# Patient Record
Sex: MALE | Race: BLACK OR AFRICAN AMERICAN | NOT HISPANIC OR LATINO | Employment: FULL TIME | URBAN - METROPOLITAN AREA
[De-identification: names, ages, dates, MRNs, and addresses within clinical notes are randomized per-mention and may not be internally consistent; named-entity substitution may affect disease eponyms.]

---

## 2020-01-03 ENCOUNTER — OFFICE VISIT (OUTPATIENT)
Dept: UROLOGY | Facility: CLINIC | Age: 60
End: 2020-01-03

## 2020-01-03 VITALS
SYSTOLIC BLOOD PRESSURE: 132 MMHG | WEIGHT: 238 LBS | BODY MASS INDEX: 36.07 KG/M2 | RESPIRATION RATE: 18 BRPM | DIASTOLIC BLOOD PRESSURE: 84 MMHG | HEART RATE: 74 BPM | HEIGHT: 68 IN

## 2020-01-03 DIAGNOSIS — N52.9 ERECTILE DYSFUNCTION, UNSPECIFIED ERECTILE DYSFUNCTION TYPE: ICD-10-CM

## 2020-01-03 DIAGNOSIS — N13.8 BPH WITH URINARY OBSTRUCTION: Primary | ICD-10-CM

## 2020-01-03 DIAGNOSIS — N40.1 BPH WITH URINARY OBSTRUCTION: Primary | ICD-10-CM

## 2020-01-03 PROCEDURE — 99213 OFFICE O/P EST LOW 20 MIN: CPT | Mod: S$GLB,,, | Performed by: SPECIALIST

## 2020-01-03 PROCEDURE — 99213 PR OFFICE/OUTPT VISIT, EST, LEVL III, 20-29 MIN: ICD-10-PCS | Mod: S$GLB,,, | Performed by: SPECIALIST

## 2020-01-03 RX ORDER — TAMSULOSIN HYDROCHLORIDE 0.4 MG/1
0.4 CAPSULE ORAL DAILY
Qty: 30 CAPSULE | Refills: 11 | Status: SHIPPED | OUTPATIENT
Start: 2020-01-03 | End: 2020-10-26

## 2020-01-03 NOTE — PROGRESS NOTES
Subjective:       Patient ID: Piter Robert is a 59 y.o. male.    Chief Complaint: Follow-up (6mths) and Other (been eating poorly/drinking poorly causing a slow stream at times)      HPI:  59-year-old  man who was here following up with me for lower tract symptoms.  He has BPH with very minimal lower urinary tract symptoms.  Recently the symptoms are worsening.  He reports that he has urinary frequency is urgency is.  He clearly understands that it was because he was eating a lot of sweets.  He is trying actively to cut back on those.  But he is open to the idea of trying Flomax which we had introduced to him while back.    He has problems erections.  He uses Cialis when it is available for him and he would like to remain on the Cialis    He did have a digital rectal exam and PSA checked July of 2019 prostate exam was benign.    Past Medical History: History reviewed. No pertinent past medical history.    Past Surgical Historical:   Past Surgical History:   Procedure Laterality Date    TRANSURETHRAL RESECTION OF PROSTATE          Medications:      Past Social History:   Social History     Socioeconomic History    Marital status: Single     Spouse name: Not on file    Number of children: Not on file    Years of education: Not on file    Highest education level: Not on file   Occupational History    Not on file   Social Needs    Financial resource strain: Not on file    Food insecurity:     Worry: Not on file     Inability: Not on file    Transportation needs:     Medical: Not on file     Non-medical: Not on file   Tobacco Use    Smoking status: Former Smoker     Types: Cigarettes    Smokeless tobacco: Never Used   Substance and Sexual Activity    Alcohol use: Never     Frequency: Never    Drug use: Never    Sexual activity: Not on file   Lifestyle    Physical activity:     Days per week: Not on file     Minutes per session: Not on file    Stress: Not on file   Relationships    Social  connections:     Talks on phone: Not on file     Gets together: Not on file     Attends Evangelical service: Not on file     Active member of club or organization: Not on file     Attends meetings of clubs or organizations: Not on file     Relationship status: Not on file   Other Topics Concern    Not on file   Social History Narrative    Not on file       Allergies: Review of patient's allergies indicates:  No Known Allergies     Family History:   Family History   Problem Relation Age of Onset    No Known Problems Father     Cancer Mother         Review of Systems:   systems reviewed and notable for review to worsening lower urinary tract symptoms  All other systems were reviewed Neg except as stated in the HPI    Physical Exam:  General: A&Ox3. No apparent distress. No deformities.  Neck: No masses. Normal thyroid.  Lungs: normal inspiration. No use of accessory muscles.  Heart: normal pulse. No arrhythmias.  Abdomen: Soft. NT. ND. No masses. No hernias. No hepatosplenomegaly.  Lymphatic: Neck and groin nodes negative.  Skin: The skin is warm and dry. No jaundice.  Neurology: Cranial nerves 2-12 crossly intact, no focal weaknesses, no sensation deficits, no motor deficits  Ext: No clubbing, cyanosis or edema.  :  Deferred      Assessment/Plan:       59-year-old man with BPH and lower tract symptoms worsening in nature.  He also has erectile dysfunction.    1.  Start him on Flomax 0.4 mg at bedtime.  We going to call send the prescription to his pharmacy.  2.  Return to see us in 6 weeks for interim checked.  Once the symptoms of urination are controlled we going to revisit but doing the Cialis for erectile problems.  3.  He is up-to-date on PSA and SILVIO checks.    Problem List Items Addressed This Visit        Renal/    BPH with urinary obstruction - Primary    Erectile dysfunction

## 2020-02-24 ENCOUNTER — OFFICE VISIT (OUTPATIENT)
Dept: UROLOGY | Facility: CLINIC | Age: 60
End: 2020-02-24

## 2020-02-24 VITALS
WEIGHT: 244 LBS | HEIGHT: 68 IN | RESPIRATION RATE: 18 BRPM | HEART RATE: 73 BPM | BODY MASS INDEX: 36.98 KG/M2 | DIASTOLIC BLOOD PRESSURE: 72 MMHG | SYSTOLIC BLOOD PRESSURE: 124 MMHG

## 2020-02-24 DIAGNOSIS — N40.1 BPH WITH URINARY OBSTRUCTION: Primary | ICD-10-CM

## 2020-02-24 DIAGNOSIS — N52.9 ERECTILE DYSFUNCTION, UNSPECIFIED ERECTILE DYSFUNCTION TYPE: ICD-10-CM

## 2020-02-24 DIAGNOSIS — N13.8 BPH WITH URINARY OBSTRUCTION: Primary | ICD-10-CM

## 2020-02-24 PROCEDURE — 99213 OFFICE O/P EST LOW 20 MIN: CPT | Mod: S$GLB,,, | Performed by: SPECIALIST

## 2020-02-24 PROCEDURE — 99213 PR OFFICE/OUTPT VISIT, EST, LEVL III, 20-29 MIN: ICD-10-PCS | Mod: S$GLB,,, | Performed by: SPECIALIST

## 2020-02-24 NOTE — PROGRESS NOTES
Subjective:       Patient ID: Piter Robert is a 59 y.o. male.    Chief Complaint: Other (6 week f/u. pt states flomax is helping)      HPI:  59-year-old  man who is here for 6 week follow-up.    He has BPH and lower tract symptoms.  Recently we restarted him on Flomax.  He is reporting any improvement in his ability to void and empty his bladder.  Symptoms a diminishing.  He is very pleased.    He has erectile dysfunction at this point he has taken PD 5 inhibitors in the past and wants to resume dose.  He is interested in Cialis.    He is up-to-date on his digital rectal exam and PSA check.  These were both done in July of 2019.    Past Medical History: History reviewed. No pertinent past medical history.    Past Surgical Historical:   Past Surgical History:   Procedure Laterality Date    TRANSURETHRAL RESECTION OF PROSTATE          Medications:   Medication List with Changes/Refills   Current Medications    TAMSULOSIN (FLOMAX) 0.4 MG CAP    Take 1 capsule (0.4 mg total) by mouth once daily.        Past Social History:   Social History     Socioeconomic History    Marital status: Single     Spouse name: Not on file    Number of children: Not on file    Years of education: Not on file    Highest education level: Not on file   Occupational History    Not on file   Social Needs    Financial resource strain: Not on file    Food insecurity:     Worry: Not on file     Inability: Not on file    Transportation needs:     Medical: Not on file     Non-medical: Not on file   Tobacco Use    Smoking status: Former Smoker     Types: Cigarettes    Smokeless tobacco: Never Used   Substance and Sexual Activity    Alcohol use: Never     Frequency: Never    Drug use: Never    Sexual activity: Not on file   Lifestyle    Physical activity:     Days per week: Not on file     Minutes per session: Not on file    Stress: Not on file   Relationships    Social connections:     Talks on phone: Not on file      Gets together: Not on file     Attends Cheondoism service: Not on file     Active member of club or organization: Not on file     Attends meetings of clubs or organizations: Not on file     Relationship status: Not on file   Other Topics Concern    Not on file   Social History Narrative    Not on file       Allergies: Review of patient's allergies indicates:  No Known Allergies     Family History:   Family History   Problem Relation Age of Onset    No Known Problems Father     Cancer Mother         Review of Systems:   systems reviewed and notable for BPH and lower tract symptoms as well as erectile dysfunction  All other systems were reviewed Neg except as stated in the HPI      Physical Exam:  General: A&Ox3. No apparent distress. No deformities.  Neck: No masses. Normal thyroid.  Lungs: normal inspiration. No use of accessory muscles.  Heart: normal pulse. No arrhythmias.  Abdomen: Soft. NT. ND. No masses. No hernias. No hepatosplenomegaly.  Lymphatic: Neck and groin nodes negative.  Skin: The skin is warm and dry. No jaundice.  Neurology: Cranial nerves 2-12 crossly intact, no focal weaknesses, no sensation deficits, no motor deficits  Ext: No clubbing, cyanosis or edema.  :  Deferred      Assessment/Plan:       59-year-old man with BPH and lower tract symptoms as well as erectile dysfunction.    1.  I gave him prescription today for Tadalafil 5 mg to take as needed for sexual activity.  2.  Continue taking Flomax on a daily basis  3.  Return to clinic in July of 2020 for PSA and digital rectal exam.    Problem List Items Addressed This Visit        Renal/    BPH with urinary obstruction - Primary    Erectile dysfunction

## 2020-08-07 ENCOUNTER — OFFICE VISIT (OUTPATIENT)
Dept: UROLOGY | Facility: CLINIC | Age: 60
End: 2020-08-07

## 2020-08-07 ENCOUNTER — DOCUMENTATION ONLY (OUTPATIENT)
Dept: UROLOGY | Facility: CLINIC | Age: 60
End: 2020-08-07

## 2020-08-07 VITALS
HEART RATE: 89 BPM | RESPIRATION RATE: 18 BRPM | BODY MASS INDEX: 36.98 KG/M2 | SYSTOLIC BLOOD PRESSURE: 132 MMHG | DIASTOLIC BLOOD PRESSURE: 82 MMHG | WEIGHT: 244 LBS | HEIGHT: 68 IN

## 2020-08-07 DIAGNOSIS — N40.1 BPH WITH URINARY OBSTRUCTION: ICD-10-CM

## 2020-08-07 DIAGNOSIS — N52.9 ERECTILE DYSFUNCTION, UNSPECIFIED ERECTILE DYSFUNCTION TYPE: ICD-10-CM

## 2020-08-07 DIAGNOSIS — R31.0 HEMATURIA, GROSS: Primary | ICD-10-CM

## 2020-08-07 DIAGNOSIS — N13.8 BPH WITH URINARY OBSTRUCTION: ICD-10-CM

## 2020-08-07 DIAGNOSIS — Z01.812 PRE-OPERATIVE LABORATORY EXAMINATION: ICD-10-CM

## 2020-08-07 LAB
ANION GAP SERPL CALC-SCNC: 11 MMOL/L (ref 8–17)
BUN/CREAT SERPL: 17.1 (ref 6–20)
CALCIUM SERPL-MCNC: 9.7 MG/DL (ref 8.6–10.2)
CARBON DIOXIDE, CO2: 27 MMOL/L (ref 22–29)
CHLORIDE: 98 MMOL/L (ref 98–107)
CREAT SERPL-MCNC: 1 MG/DL (ref 0.7–1.2)
GFR ESTIMATION: 77.02
GLUCOSE: 109 MG/DL (ref 74–106)
POTASSIUM: 4.2 MMOL/L (ref 3.5–5.1)
PSA, DIAGNOSTIC: 4.79 NG/ML (ref 0–4)
SODIUM: 136 MMOL/L (ref 136–145)
UREA NITROGEN (BUN): 17.1 MG/DL (ref 6–20)

## 2020-08-07 PROCEDURE — 99213 OFFICE O/P EST LOW 20 MIN: CPT | Mod: S$GLB,,, | Performed by: SPECIALIST

## 2020-08-07 PROCEDURE — 99213 PR OFFICE/OUTPT VISIT, EST, LEVL III, 20-29 MIN: ICD-10-PCS | Mod: S$GLB,,, | Performed by: SPECIALIST

## 2020-08-07 NOTE — PROGRESS NOTES
This documentation is to note his PSA variations over the years.    PSA June 2017 was 2.7 ng/mL  PSA July 2018 was 2.75 ng/mL  PSA July 2019 was 3.2 ng/mL  PSA August 2020 was 4.79 ng/mL.  (patient presented with gross hematuria during this visit.)

## 2020-08-07 NOTE — PROGRESS NOTES
Subjective:       Patient ID: Piter Robert is a 59 y.o. male.    Chief Complaint: Hematuria (approx 2 days)      HPI:  59-year-old  man known to me who was presented to me today reporting painless gross hematuria for the last 2 days.  He reported a little bit of discomfort in the left flank but no severe pain.  He does not have any severe risk factors.  He is not a smoker.  He does not work in a high-risk occupation.  He has a history of BPH which were managing with Flomax.  But he reports that he stop Flomax about a month ago.  He has been voiding very well ever since.    He has erectile dysfunction and when given him a script for Tadalafil 5 mg at the last visit.  He is no longer taking it at this point and he is doing well in terms of his erections.    Last digital rectal exam and PSA were from July of 2019 and he is due today.    Past Medical History: History reviewed. No pertinent past medical history.    Past Surgical Historical:   Past Surgical History:   Procedure Laterality Date    TRANSURETHRAL RESECTION OF PROSTATE          Medications:   Medication List with Changes/Refills   Current Medications    TAMSULOSIN (FLOMAX) 0.4 MG CAP    Take 1 capsule (0.4 mg total) by mouth once daily.        Past Social History:   Social History     Socioeconomic History    Marital status: Single     Spouse name: Not on file    Number of children: Not on file    Years of education: Not on file    Highest education level: Not on file   Occupational History    Not on file   Social Needs    Financial resource strain: Not on file    Food insecurity     Worry: Not on file     Inability: Not on file    Transportation needs     Medical: Not on file     Non-medical: Not on file   Tobacco Use    Smoking status: Former Smoker     Types: Cigarettes    Smokeless tobacco: Never Used   Substance and Sexual Activity    Alcohol use: Never     Frequency: Never    Drug use: Never    Sexual activity: Not on file    Lifestyle    Physical activity     Days per week: Not on file     Minutes per session: Not on file    Stress: Not on file   Relationships    Social connections     Talks on phone: Not on file     Gets together: Not on file     Attends Hindu service: Not on file     Active member of club or organization: Not on file     Attends meetings of clubs or organizations: Not on file     Relationship status: Not on file   Other Topics Concern    Not on file   Social History Narrative    Not on file       Allergies: Review of patient's allergies indicates:  No Known Allergies     Family History:   Family History   Problem Relation Age of Onset    No Known Problems Father     Cancer Mother         Review of Systems:   systems reviewed and notable for gross hematuria  All other systems were reviewed Neg except as stated in the HPI    Physical Exam:  General: A&Ox3. No apparent distress. No deformities.  Neck: No masses. Normal thyroid.  Lungs: normal inspiration. No use of accessory muscles.  Heart: normal pulse. No arrhythmias.  Abdomen: Soft. NT. ND. No masses. No hernias. No hepatosplenomegaly.  Lymphatic: Neck and groin nodes negative.  Skin: The skin is warm and dry. No jaundice.  Neurology: Cranial nerves 2-12 crossly intact, no focal weaknesses, no sensation deficits, no motor deficits  Ext: No clubbing, cyanosis or edema.  : External genitalia normal.  Meatus is adequate and inner normal position phallus is normal scrotum is normal.    Anus/perineum normal. Normal Sphincter tone. Prostate is soft, surface smooth, size estimated at about 40 grams    Urinalysis:  Microscopic evaluation and urine today shows 0-3 white cells per high-power field, too numerous to count red cells.  No evidence of any bacteria    Assessment/Plan:       59-year-old man with gross hematuria was presenting for follow-up.  He also has BPH.    1.  Obtain blood for BMP today  2.  Obtain blood for serum PSA today  3.  Digital rectal  exam is been completed today  4.  Set him up for office cystoscopy  5.  After the BMP is reviewed we will order a CT urogram for hematuria evaluation.  6.  Continue monitoring BPH and lower tract symptoms.  Patient has taking himself off of Flomax and he is doing well.  7.  Continue monitoring erections.  He stopped taking they generic Cialis and is doing well.    Problem List Items Addressed This Visit        Renal/    BPH with urinary obstruction    Relevant Orders    Prostate Specific Antigen, Diagnostic    Erectile dysfunction      Other Visit Diagnoses     Pre-operative laboratory examination    -  Primary    Relevant Orders    Basic metabolic panel (Completed)    Hematuria, gross

## 2020-08-10 ENCOUNTER — TELEPHONE (OUTPATIENT)
Dept: UROLOGY | Facility: CLINIC | Age: 60
End: 2020-08-10

## 2020-08-10 NOTE — TELEPHONE ENCOUNTER
Spoke with pt regarding lab results given, advised that [facility] will proceed with CT urogram and cystoscopy. Pt verbalized understanding. NB

## 2020-09-21 ENCOUNTER — OFFICE VISIT (OUTPATIENT)
Dept: UROLOGY | Facility: CLINIC | Age: 60
End: 2020-09-21

## 2020-09-21 VITALS
WEIGHT: 238 LBS | HEART RATE: 67 BPM | HEIGHT: 68 IN | RESPIRATION RATE: 18 BRPM | BODY MASS INDEX: 36.07 KG/M2 | SYSTOLIC BLOOD PRESSURE: 129 MMHG | DIASTOLIC BLOOD PRESSURE: 83 MMHG

## 2020-09-21 DIAGNOSIS — N40.1 BPH WITH URINARY OBSTRUCTION: ICD-10-CM

## 2020-09-21 DIAGNOSIS — R31.0 HEMATURIA, GROSS: Primary | ICD-10-CM

## 2020-09-21 DIAGNOSIS — N13.8 BPH WITH URINARY OBSTRUCTION: ICD-10-CM

## 2020-09-21 PROCEDURE — 81001 URINALYSIS AUTO W/SCOPE: CPT | Mod: S$GLB,,, | Performed by: SPECIALIST

## 2020-09-21 PROCEDURE — 99213 OFFICE O/P EST LOW 20 MIN: CPT | Mod: 25,S$GLB,, | Performed by: SPECIALIST

## 2020-09-21 PROCEDURE — 81001 PR  URINALYSIS, AUTO, W/SCOPE: ICD-10-PCS | Mod: S$GLB,,, | Performed by: SPECIALIST

## 2020-09-21 PROCEDURE — 99213 PR OFFICE/OUTPT VISIT, EST, LEVL III, 20-29 MIN: ICD-10-PCS | Mod: 25,S$GLB,, | Performed by: SPECIALIST

## 2020-09-21 RX ORDER — LACTULOSE 10 G/15ML
SOLUTION ORAL; RECTAL 3 TIMES DAILY
COMMUNITY

## 2020-09-21 NOTE — PROGRESS NOTES
Subjective:       Patient ID: Piter Robert is a 59 y.o. male.    Chief Complaint: Hematuria (This morning and a few days ago)      HPI: 59-year-old  man known to me last seen in clinic on 8/7/2020 for painless gross hematuria.  He was set up for CT urogram which he has not done yet.  He is to pending cystoscopy.  He did well since that last visit in August until recently when he started experiencing gross hematuria a few days ago and then again this morning.  He denies any burning with urination denies any signs of urinary tract infection. He does not have any severe risk factors for urothelial malignancy.  He is not a smoker.  He does not work in a high-risk occupation.  He has a history of BPH which were managing with Flomax which he stopped about 2 months ago.  He has been voiding very well ever since.     He has erectile dysfunction for which he uses Tadalafil 5 mg which works well for him when needed.  He does not need all the time.       He had a PSA and a digital rectal exam on the visit on 08/07/2020.  SILVIO was benign.    PSA history shown below    PSA history:  PSA June 2017 was 2.7 ng/mL  PSA July 2018 was 2.75 ng/mL  PSA July 2019 was 3.2 ng/mL  PSA August 2020 was 4.79 ng/mL.  (patient presented with gross hematuria during this visit.)    Past Medical History: History reviewed. No pertinent past medical history.    Past Surgical Historical:   Past Surgical History:   Procedure Laterality Date    TRANSURETHRAL RESECTION OF PROSTATE          Medications:   Medication List with Changes/Refills   Current Medications    LACTULOSE (CHRONULAC) 10 GRAM/15 ML SOLUTION    Take by mouth 3 (three) times daily.    TAMSULOSIN (FLOMAX) 0.4 MG CAP    Take 1 capsule (0.4 mg total) by mouth once daily.        Past Social History:   Social History     Socioeconomic History    Marital status: Single     Spouse name: Not on file    Number of children: Not on file    Years of education: Not on file     Highest education level: Not on file   Occupational History    Not on file   Social Needs    Financial resource strain: Not on file    Food insecurity     Worry: Not on file     Inability: Not on file    Transportation needs     Medical: Not on file     Non-medical: Not on file   Tobacco Use    Smoking status: Former Smoker     Types: Cigarettes    Smokeless tobacco: Never Used   Substance and Sexual Activity    Alcohol use: Never     Frequency: Never    Drug use: Never    Sexual activity: Not on file   Lifestyle    Physical activity     Days per week: Not on file     Minutes per session: Not on file    Stress: Not on file   Relationships    Social connections     Talks on phone: Not on file     Gets together: Not on file     Attends Zoroastrianism service: Not on file     Active member of club or organization: Not on file     Attends meetings of clubs or organizations: Not on file     Relationship status: Not on file   Other Topics Concern    Not on file   Social History Narrative    Not on file       Allergies: Review of patient's allergies indicates:  No Known Allergies     Family History:   Family History   Problem Relation Age of Onset    No Known Problems Father     Cancer Mother         Review of Systems:   systems reviewed and notable for gross painless hematuria  All other systems were reviewed Neg except as stated in the HPI    Physical Exam:  General: A&Ox3. No apparent distress. No deformities.  Neck: No masses. Normal thyroid.  Lungs: normal inspiration. No use of accessory muscles.  Heart: normal pulse. No arrhythmias.  Abdomen: Soft. NT. ND. No masses. No hernias. No hepatosplenomegaly.  Lymphatic: Neck and groin nodes negative.  Skin: The skin is warm and dry. No jaundice.  Neurology: Cranial nerves 2-12 crossly intact, no focal weaknesses, no sensation deficits, no motor deficits  Ext: No clubbing, cyanosis or edema.  :  Deferred    Urinalysis:  Dipstick part shows some protein urea  negative for glucosuria nitrite negative leukocytes negative.  Microscopy showed 0-5 wbc's greater than 100 RBCs red epithelial cells trace bacteria    Assessment/Plan:       59-year-old male presenting with painless gross hematuria.    1.  [place] a CT urogram is already in the system.  We were make sure that he set up for imaging  2.  Set him up for cystoscopy in office  3.  Urinalysis was reviewed toda no evidence of an active infection.  Severe microscopic hematuria reported    Problem List Items Addressed This Visit        Renal/    BPH with urinary obstruction      Other Visit Diagnoses     Hematuria, gross    -  Primary    Relevant Orders    Cystoscopy

## 2020-09-28 ENCOUNTER — TELEPHONE (OUTPATIENT)
Dept: UROLOGY | Facility: CLINIC | Age: 60
End: 2020-09-28

## 2020-09-28 NOTE — TELEPHONE ENCOUNTER
Spoke with pt, advised him of Lauren reason for telephone call. Pt verbalized understanding. VINITA

## 2020-10-01 ENCOUNTER — TELEPHONE (OUTPATIENT)
Dept: UROLOGY | Facility: CLINIC | Age: 60
End: 2020-10-01

## 2020-10-01 NOTE — TELEPHONE ENCOUNTER
----- Message from Ally Cisneros sent at 10/1/2020  2:14 PM CDT -----  Patient called in regards to r/s his procedure , patient has been calling since this morning , please call back at  [phone].          Thanks,  Ally Cisneros

## 2020-10-05 ENCOUNTER — PROCEDURE VISIT (OUTPATIENT)
Dept: UROLOGY | Facility: CLINIC | Age: 60
End: 2020-10-05

## 2020-10-05 VITALS
WEIGHT: 232 LBS | SYSTOLIC BLOOD PRESSURE: 146 MMHG | RESPIRATION RATE: 18 BRPM | DIASTOLIC BLOOD PRESSURE: 79 MMHG | HEART RATE: 77 BPM | OXYGEN SATURATION: 99 % | HEIGHT: 68 IN | BODY MASS INDEX: 35.16 KG/M2

## 2020-10-05 DIAGNOSIS — R31.0 HEMATURIA, GROSS: ICD-10-CM

## 2020-10-05 PROCEDURE — 52281 CYSTOSCOPY AND TREATMENT: CPT | Mod: S$GLB,,, | Performed by: SPECIALIST

## 2020-10-05 PROCEDURE — 51741 ELECTRO-UROFLOWMETRY FIRST: CPT | Mod: 26,S$GLB,, | Performed by: SPECIALIST

## 2020-10-05 PROCEDURE — 51741 PR UROFLOWMETRY, COMPLEX: ICD-10-PCS | Mod: 26,S$GLB,, | Performed by: SPECIALIST

## 2020-10-05 PROCEDURE — 52281 PR CYSTOSCOPY,DIL URETHRAL STRICTURE: ICD-10-PCS | Mod: S$GLB,,, | Performed by: SPECIALIST

## 2020-10-05 RX ORDER — CIPROFLOXACIN 500 MG/1
500 TABLET ORAL
Status: COMPLETED | OUTPATIENT
Start: 2020-10-05 | End: 2020-10-05

## 2020-10-05 RX ADMIN — CIPROFLOXACIN 500 MG: 500 TABLET ORAL at 08:10

## 2020-10-05 NOTE — PATIENT INSTRUCTIONS
Cystoscopy    Cystoscopy is a procedure that lets your doctor look directly inside your urethra and bladder. It can be used to:  · Help diagnose a problem with your urethra, bladder, or kidneys.  · Take a sample (biopsy) of bladder or urethral tissue.  · Treat certain problems (such as removing kidney stones).  · Place a stent to bypass an obstruction.  · Take special X-rays of the kidneys.  Based on the findings, your doctor may recommend other tests or treatments.  What is a cystoscope?  A cystoscope is a telescope-like instrument that contains lenses and fiberoptics (small glass wires that make bright light). The cystoscope may be straight and rigid, or flexible to bend around curves in the urethra. The doctor may look directly into the cystoscope, or project the image onto a monitor.  Getting ready  · Ask your doctor if you should stop taking any medicines before the procedure.  · Ask whether you should avoid eating or drinking anything after midnight before the procedure.  · Follow any other instructions your doctor gives you.  Tell your doctor before the exam if you:  · Take any medicines, such as aspirin or blood thinners  · Have allergies to any medicines  · Are pregnant   The procedure  Cystoscopy is done in the doctors office, surgery center, or hospital. The doctor and a nurse are present during the procedure. It takes only a few minutes, longer if a biopsy, X-ray, or treatment needs to be done.  During the procedure:  · You lie on an exam table on your back, knees bent and legs apart. You are covered with a drape.  · Your urethra and the area around it are washed. Anesthetic jelly may be applied to numb the urethra. Other pain medicine is usually not needed. In some cases, you may be offered a mild sedative to help you relax. If a more extensive procedure is to be done, such as a biopsy or kidney stone removal, general anesthesia may be needed.  · The cystoscope is inserted. A sterile fluid is put  into the bladder to expand it. You may feel pressure from this fluid.  · When the procedure is done, the cystoscope is removed.  After the procedure  If you had a sedative, general anesthesia, or spinal anesthesia, you must have someone drive you home. Once youre home:  · Drink plenty of fluids.  · You may have burning or light bleeding when you urinate--this is normal.  · Medicines may be prescribed to ease any discomfort or prevent infection. Take these as directed.  · Call your doctor if you have heavy bleeding or blood clots, burning that lasts more than a day, a fever over 100°F  (38° C), or trouble urinating.  Date Last Reviewed: 1/1/2017  © 8593-6989 [employer]. [address] [place] [address], [place] [address]. All rights reserved. This information is not intended as a substitute for professional medical care. Always follow your healthcare professional's instructions.

## 2020-10-05 NOTE — PROCEDURES
"Cystoscopy    Date/Time: 10/5/2020 8:20 AM  Performed by: Facundo Cuellar MD  Authorized by: Facundo Cuellar MD     Consent Done?:  Yes (Written)  Time out: Immediately prior to procedure a "time out" was called to verify the correct patient, procedure, equipment, support staff and site/side marked as required.    Indications: hematuria    Position:  Supine  Anesthesia:  Intraurethral instillation  Patient sedated?: No    Preparation: Patient was prepped and draped in usual sterile fashion      Scope type:  Flexible cystoscope  External exam normal: Yes    Digital exam performed: No    Urethra normal: No         Urethral Internal Findings:  Stricture  Prostate normal: No (History of previous TURP.  There was some dystrophic calcification on the prostate.  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  We passed a wire through the flexible scope and did a fascial dilation of the stricture stricture in order to be able to extend and passed the scope into the bladder.      Complex Uroflow    Date/Time: 10/5/2020 8:20 AM  Performed by: Facundo Cuellar MD  Authorized by: Facundo Cuellar MD   Preparation: Patient was prepped and draped in the usual sterile fashion.  Local anesthesia used: no    Anesthesia:  Local anesthesia used: no    Sedation:  Patient sedated: no    Patient tolerance: patient tolerated the procedure well with no immediate complications  Comments: Voided volume:  218 mL  Peak flow:  16 mL/sec  Residual by bladder scan ultrasound:  7 mL  Flow curve:  Bell curve    Assessment:  History of gross hematuria, history of previous TURP with dystrophic prostatic calcifications.  CT scan showed thickened bladder wall " nonspecific as well as a small nonobstructing stone in one of the kidneys.  Recent PSA was 4.79.  Patient has a history of prostate biopsy in 2014.    Plan:  1.  I will set him up for repeat prostate biopsy.  His PSA is high enough to warrant a biopsy.  2.  If the biopsy is negative then I will bring him back for a redo TURP to address or the dystrophic calcifications was I think is the major source of his bleeding  3.  If the biopsy is positive then we could talk about prostatectomy.  4.  Which see patient back in clinic for transrectal ultrasound-guided biopsy of the prostate

## 2020-10-14 ENCOUNTER — TELEPHONE (OUTPATIENT)
Dept: UROLOGY | Facility: CLINIC | Age: 60
End: 2020-10-14

## 2020-10-14 NOTE — TELEPHONE ENCOUNTER
Called to confirm appt for bx in the morning (10/15/20); pt did not answer, left message on voicemail to be here for 0750 and to remind pt to have a  and to do his fleets enema 2 hours prior to appt.

## 2020-10-15 ENCOUNTER — PROCEDURE VISIT (OUTPATIENT)
Dept: UROLOGY | Facility: CLINIC | Age: 60
End: 2020-10-15

## 2020-10-15 VITALS
DIASTOLIC BLOOD PRESSURE: 73 MMHG | BODY MASS INDEX: 33.19 KG/M2 | OXYGEN SATURATION: 95 % | HEIGHT: 68 IN | HEART RATE: 72 BPM | RESPIRATION RATE: 18 BRPM | SYSTOLIC BLOOD PRESSURE: 123 MMHG | WEIGHT: 219 LBS

## 2020-10-15 DIAGNOSIS — R97.20 ELEVATED PSA: Primary | ICD-10-CM

## 2020-10-15 PROCEDURE — 96372 PR INJECTION,THERAP/PROPH/DIAG2ST, IM OR SUBCUT: ICD-10-PCS | Mod: 59,S$GLB,, | Performed by: SPECIALIST

## 2020-10-15 PROCEDURE — 55700 TRUS: CPT | Mod: S$GLB,,, | Performed by: SPECIALIST

## 2020-10-15 PROCEDURE — 76872 TRUS: ICD-10-PCS | Mod: S$GLB,,, | Performed by: SPECIALIST

## 2020-10-15 PROCEDURE — 55700 TRUS: ICD-10-PCS | Mod: S$GLB,,, | Performed by: SPECIALIST

## 2020-10-15 PROCEDURE — 96372 THER/PROPH/DIAG INJ SC/IM: CPT | Mod: 59,S$GLB,, | Performed by: SPECIALIST

## 2020-10-15 PROCEDURE — 76872 US TRANSRECTAL: CPT | Mod: S$GLB,,, | Performed by: SPECIALIST

## 2020-10-15 RX ORDER — PROMETHAZINE HYDROCHLORIDE 25 MG/ML
25 INJECTION, SOLUTION INTRAMUSCULAR; INTRAVENOUS
Status: COMPLETED | OUTPATIENT
Start: 2020-10-15 | End: 2020-10-15

## 2020-10-15 RX ORDER — DIAZEPAM 10 MG/1
10 TABLET ORAL
Status: COMPLETED | OUTPATIENT
Start: 2020-10-15 | End: 2020-10-15

## 2020-10-15 RX ORDER — GENTAMICIN SULFATE 40 MG/ML
80 INJECTION, SOLUTION INTRAMUSCULAR; INTRAVENOUS ONCE
Status: COMPLETED | OUTPATIENT
Start: 2020-10-15 | End: 2020-10-15

## 2020-10-15 RX ORDER — CEFTRIAXONE 1 G/1
1 INJECTION, POWDER, FOR SOLUTION INTRAMUSCULAR; INTRAVENOUS
Status: COMPLETED | OUTPATIENT
Start: 2020-10-15 | End: 2020-10-15

## 2020-10-15 RX ORDER — MEPERIDINE HYDROCHLORIDE 25 MG/ML
25 INJECTION INTRAMUSCULAR; INTRAVENOUS; SUBCUTANEOUS ONCE
Status: COMPLETED | OUTPATIENT
Start: 2020-10-15 | End: 2020-10-15

## 2020-10-15 RX ADMIN — CEFTRIAXONE 1 G: 1 INJECTION, POWDER, FOR SOLUTION INTRAMUSCULAR; INTRAVENOUS at 09:10

## 2020-10-15 RX ADMIN — PROMETHAZINE HYDROCHLORIDE 25 MG: 25 INJECTION, SOLUTION INTRAMUSCULAR; INTRAVENOUS at 09:10

## 2020-10-15 RX ADMIN — MEPERIDINE HYDROCHLORIDE 25 MG: 25 INJECTION INTRAMUSCULAR; INTRAVENOUS; SUBCUTANEOUS at 09:10

## 2020-10-15 RX ADMIN — GENTAMICIN SULFATE 80 MG: 40 INJECTION, SOLUTION INTRAMUSCULAR; INTRAVENOUS at 09:10

## 2020-10-15 RX ADMIN — DIAZEPAM 10 MG: 10 TABLET ORAL at 09:10

## 2020-10-15 NOTE — PROCEDURES
"TRUS    Date/Time: 10/15/2020 8:20 AM  Performed by: Facundo Cuellar MD  Authorized by: Facundo Cuellar MD     Consent Done?:  Yes (Written)  Time out: Immediately prior to procedure a "time out" was called to verify the correct patient, procedure, equipment, support staff and site/side marked as required.    Indications: Elevated PSA    Position:  Left lateral  Anesthesia:  10cc's 1% Lidocaine and Lidocaine jelly  Patient sedated: Yes    Sedation:  Other  Prostate Size:  49.96 g  Lesions:: Yes         Type:  Mixed hypo- and hyperechoic  Left Base Biopsies: 2  Left Mid Biopsies: 2  Left [place] Biopsies: 2  Right Base Biopsies: 2  Right Mid Biopsies: 2  Right [place] Biopsies: 2  Transitional zone: No    Total Biopsies:  12    Patient tolerance:  Patient tolerated the procedure well with no immediate complications     Patient was given post biopsy instructions.  He was instructed of the possibility of blood in the urine blood in the stool blood in the semen for up to 6-8 weeks.  He has been instructed to watch out for any flu-like symptoms malaise fatigue etc.  Patient has been notified to give us a call if he has any temperatures.  We should see him in clinic in 2 weeks for post biopsy results.      Briefly 59-year-old man who presented for gross hematuria evaluation.  CT urogram showed an enlarged prostate.  No upper tract abnormalities.  Cystoscopy showed history of previous TURP with dystrophic calcifications in the prostatic fossa.  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  This could potentially be the source of the bleeding.  Patient's PSA was elevated so we recommended a biopsy for which he is here today.  The plan will be to see what the biopsy results show and make a decision if we will bring him back for a repeat TURP or a prostatectomy.        "

## 2020-10-15 NOTE — PATIENT INSTRUCTIONS

## 2020-10-17 ENCOUNTER — DOCUMENTATION ONLY (OUTPATIENT)
Dept: UROLOGY | Facility: CLINIC | Age: 60
End: 2020-10-17

## 2020-10-17 NOTE — PROGRESS NOTES
Outside records from a [facility] Department urology reviewed.  .  Additional records showed that a transrectal ultrasound of the prostate was performed on September 15, 2011.  Transrectal ultrasound volume of the prostate at that time was estimated at about 60 g.  He had another transrectal ultrasound-guided biopsy of the prostate in March 12, 2013.  Trust volume at that time was estimated at 83 g. TURP was perform on January 2, 2014, there is no indication of how much tissue was removed.

## 2020-10-26 ENCOUNTER — OFFICE VISIT (OUTPATIENT)
Dept: UROLOGY | Facility: CLINIC | Age: 60
End: 2020-10-26

## 2020-10-26 VITALS
HEIGHT: 68 IN | WEIGHT: 219 LBS | HEART RATE: 78 BPM | SYSTOLIC BLOOD PRESSURE: 143 MMHG | BODY MASS INDEX: 33.19 KG/M2 | DIASTOLIC BLOOD PRESSURE: 83 MMHG

## 2020-10-26 DIAGNOSIS — R31.0 HEMATURIA, GROSS: ICD-10-CM

## 2020-10-26 DIAGNOSIS — N40.1 BPH WITH OBSTRUCTION/LOWER URINARY TRACT SYMPTOMS: Primary | ICD-10-CM

## 2020-10-26 DIAGNOSIS — N13.8 BPH WITH OBSTRUCTION/LOWER URINARY TRACT SYMPTOMS: Primary | ICD-10-CM

## 2020-10-26 DIAGNOSIS — Z90.79 S/P TURP: ICD-10-CM

## 2020-10-26 PROCEDURE — 99213 OFFICE O/P EST LOW 20 MIN: CPT | Mod: S$GLB,,, | Performed by: SPECIALIST

## 2020-10-26 PROCEDURE — 99213 PR OFFICE/OUTPT VISIT, EST, LEVL III, 20-29 MIN: ICD-10-PCS | Mod: S$GLB,,, | Performed by: SPECIALIST

## 2020-10-26 NOTE — PROGRESS NOTES
Patient seen and examined.  Clinical data reviewed.  Case discussed with the nurse practitioner.  I agree with her assessment and plan.    Briefly 59-year-old man who presented with gross hematuria.  He has an enlarged prostate as well.  He has a history of a needle biopsy of the prostate multiple years ago as well as a history of TURP.  CT urogram was negative, PSA was high so we recommended a biopsy.  Cystoscopy showed regrowth of prostatic tissue with prostatic varices suggestive of source of bleeding.    He underwent a prostate biopsy about 2 weeks ago and the results showed that the biopsy was completely negative.  The plan today will be to schedule him for re-TURP.

## 2020-10-26 NOTE — PROGRESS NOTES
Chief Complaint:   Chief Complaint   Patient presents with    Other     Trus BX f/u       HPI:  59-year-old  male known to the service of Dr. Cuellar who presents for TRUS biopsy results.      He has a longstanding history of BPH with lower urinary tract symptoms.  He underwent a transrectal ultrasound of the prostate by an outside facility on September 15, 2011 with estimated prostate volume at the time around 60 g.  Another transrectal ultrasound guided biopsy of the prostate was performed on March 12, 2013 with estimated prostate volume at the time around 83 g.  TURP performed by another provider on January 2, 2014 without any documentation on how much tissue was removed.    He started experiencing gross hematuria, underwent evaluation with a CT urogram which showed an enlarged prostate and no upper tract abnormalities.  Cystoscopy showed history of previous TURP with dystrophic calcification in the prostatic fossa.  Prostatic varices were also noted.  These could possibly be the source of the bleeding.  His PSA was elevated at 4.79 NG/mL in August 2020, so we decided to perform a truss biopsy to determine if he will be taken back for repeat TURP or a prostatectomy.  He presents today for pathology results.    TRUS biopsy performed on 10/15/2020 shows benign prostatic tissue in 12/12 cores.  Estimated prostate volume at the time 49.96 g.  Patient agrees to proceeding with repeat TURP, planning for mid November 2020.    He also has a history of erectile dysfunction which he uses tadalafil 5 mg which works well for him when needed.  He does not need it all the time.    PSA history:  PSA June 2017 was 2.7 ng/mL  PSA July 2018 was 2.75 ng/mL  PSA July 2019 was 3.2 ng/mL  PSA August 2020 was 4.79 ng/mL.  (patient presented with gross hematuria during this visit.)    Allergies:  Patient has no known allergies.    Medications:  has a current medication list which includes the following prescription(s):  lactulose.    Review of Systems:  General: No fever, chills, vision changes, dizziness, weakness, fatigue, unexplained weight loss, confusion, or mood swings.  Skin: No rashes, itching, or changes in color/texture of skin.  Chest: Denies RUST, cyanosis, wheezing, cough, and sputum production.  Abdomen: Appetite fine. Denies diarrhea, abdominal pain, hematemesis, or blood in stool.  Musculoskeletal: No joint stiffness or swelling. No painful lymph nodes.  : reviewed and negative except as stated above in the HPI.  All other review of systems negative.    PMH:   has a past medical history of BPH with obstruction/lower urinary tract symptoms.    PSH:   has a past surgical history that includes Transurethral resection of prostate.    FamHx: family history includes Cancer in his mother; No Known Problems in his father.    SocHx:  reports that he has quit smoking. His smoking use included cigarettes. He has never used smokeless tobacco. He reports that he does not drink alcohol or use drugs.      Physical Exam:  Vitals:    10/26/20 1111   BP: (!) 143/83   Pulse: 78     General: AAOx3, no apparent distress, no deformities  Neck: supple, no masses, normal thyroid, full ROM  Lungs: CTAB, no adventitious breath sounds, normal inspiration, no use of accessory muscles  Heart: regular rate and rhythm, no arrhythmias  Abdomen: soft, NT, ND, no masses, no hernias, no hepatosplenomegaly  Lymphatic: no unusually enlarged or tender lymph nodes  Skin: warm and dry, no jaundice, no rash  Ext: without edema or deformity, MONTOYA, ambulates independently  : deferred    Labs/Studies: path results as above    Impression/Plan:   BPH with obstruction/lower urinary tract symptoms  Comments:  estimated prostate size 49.96g on recent TRUS biopsy which showed benign prostatic tissue in 12/12 cores, plan for repeat TURP    S/P TURP  Comments:  hx of TURP in 1/2014 by another provider    Hematuria, gross  Comments:  resolved, prostate tissue likely  the source of bleeding so will plan for repeat TURP as TRUS bx negative for cancer        Follow up in about 23 days (around 11/18/2020) for repeat TURP.

## 2020-11-02 ENCOUNTER — TELEPHONE (OUTPATIENT)
Dept: UROLOGY | Facility: CLINIC | Age: 60
End: 2020-11-02

## 2020-11-02 NOTE — TELEPHONE ENCOUNTER
----- Message from Brian Conner sent at 10/30/2020 12:31 PM CDT -----  Contact: self  Type:  Patient Returning Call    Who Called:pt  Who Left Message for Patient:n/a  Does the patient know what this is regarding?:no  Would the patient rather a call back or a response via [facility]? Call back  Best Call Back Number:[phone]  Additional Information: none

## 2020-11-10 ENCOUNTER — CLINICAL SUPPORT (OUTPATIENT)
Dept: UROLOGY | Facility: CLINIC | Age: 60
End: 2020-11-10

## 2020-11-10 DIAGNOSIS — N40.1 BPH WITH OBSTRUCTION/LOWER URINARY TRACT SYMPTOMS: Primary | ICD-10-CM

## 2020-11-10 DIAGNOSIS — N13.8 BPH WITH OBSTRUCTION/LOWER URINARY TRACT SYMPTOMS: Primary | ICD-10-CM

## 2020-11-10 NOTE — PROGRESS NOTES
Pt in clinic today for pre-admission paperwork for cystoscopy/TURP, informed pt that consents will be signed at hospital day of procedure. Short/same day papers given, advised pt will need clearance from PCP, advised pt that [facility] will contact pt the day before surgery with the time to be at the hospital, pt verbalized understanding. NB

## 2020-11-12 ENCOUNTER — TELEPHONE (OUTPATIENT)
Dept: UROLOGY | Facility: CLINIC | Age: 60
End: 2020-11-12

## 2020-11-12 NOTE — TELEPHONE ENCOUNTER
Spoke with pt and he informed that he was able to get in touch with pre-admission dept after missing the call earlier, nurse verbalized understanding. NB      ----- Message from Taya Haas sent at 11/12/2020 11:30 AM CST -----  Regarding: pt  Pt would like to speak with Sydnie. Please called back at [phone]

## 2020-11-16 LAB
ANION GAP SERPL CALC-SCNC: 6 MMOL/L (ref 3–11)
B PARAP IS1001 DNA: NOT DETECTED
B PERT.PT PROM REG: NOT DETECTED
BASOPHILS NFR BLD: 0.7 % (ref 0–3)
BUN SERPL-MCNC: 12 MG/DL (ref 7–18)
BUN/CREAT SERPL: 13.18 RATIO (ref 7–18)
C PNEUM DNA: NOT DETECTED
CALCIUM BLD-MCNC: NEGATIVE MG/DL
CALCIUM SERPL-MCNC: 8.7 MG/DL (ref 8.8–10.5)
CHLORIDE SERPL-SCNC: 104 MMOL/L (ref 100–108)
CO2 SERPL-SCNC: 28 MMOL/L (ref 21–32)
COVID-19 AB, IGM: POSITIVE
CREAT SERPL-MCNC: 0.91 MG/DL (ref 0.7–1.3)
EOSINOPHIL NFR BLD: 3 % (ref 1–3)
ERYTHROCYTE [DISTWIDTH] IN BLOOD BY AUTOMATED COUNT: 13.2 % (ref 12.5–18)
FLUAV RNA: NOT DETECTED
FLUBV RNA: NOT DETECTED
GFR ESTIMATION: > 60
GLUCOSE SERPL-MCNC: 102 MG/DL (ref 70–110)
HADV DNA: NOT DETECTED
HCOV 229E RNA: NOT DETECTED
HCOV HKU1 RNA: NOT DETECTED
HCOV NL63 RNA: NOT DETECTED
HCOV OC43 RNA: NOT DETECTED
HCT VFR BLD AUTO: 39.4 % (ref 42–52)
HGB BLD-MCNC: 12.8 G/DL (ref 14–18)
HMPV RNA: NOT DETECTED
HPIV1 RNA: NOT DETECTED
HPIV2 RNA: NOT DETECTED
HPIV3 RNA: NOT DETECTED
HPIV4 RNA: NOT DETECTED
LYMPHOCYTES NFR BLD: 48.9 % (ref 25–40)
M PNEUMO DNA: NOT DETECTED
MCH RBC QN AUTO: 30.4 PG (ref 27–31.2)
MCHC RBC AUTO-ENTMCNC: 32.5 G/DL (ref 31.8–35.4)
MCV RBC AUTO: 93.6 FL (ref 80–97)
MONOCYTES NFR BLD: 8.2 % (ref 1–15)
NEUTROPHILS # BLD AUTO: 2.23 10*3/UL (ref 1.8–7.7)
NEUTROPHILS NFR BLD: 39 % (ref 37–80)
NUCLEATED RED BLOOD CELLS: 0 %
PATIENT EMPLOYED IN HEALTHCARE: NO
PATIENT EMPLOYED IN HEALTHCARE: NO
PATIENT HAS SYMPTOMS FOR CONDITION OF INTEREST: NO
PATIENT HAS SYMPTOMS FOR CONDITION OF INTEREST: NO
PATIENT HOSPITALIZED BC COND: NO
PATIENT HOSPITALIZED BC COND: NO
PATIENT IN CONGREGATE CARE: NO
PATIENT IN CONGREGATE CARE: NO
PLATELETS: 219 10*3/UL (ref 142–424)
POTASSIUM SERPL-SCNC: 4 MMOL/L (ref 3.6–5.2)
RBC # BLD AUTO: 4.21 10*6/UL (ref 4.7–6.1)
RSV RNA: NOT DETECTED
RV+EV RNA: NOT DETECTED
SARS-COV-2 RNA RESP QL NAA+PROBE: NOT DETECTED
SODIUM BLD-SCNC: 138 MMOL/L (ref 135–145)
WBC # BLD: 5.7 10*3/UL (ref 4.6–10.2)

## 2020-11-18 ENCOUNTER — TELEPHONE (OUTPATIENT)
Dept: UROLOGY | Facility: CLINIC | Age: 60
End: 2020-11-18

## 2020-11-18 NOTE — TELEPHONE ENCOUNTER
Contacted pt and informed him that his COVID test was negative per TIGIST, informed him that the  will reach out to re-schedule procedure. NB      ----- Message from Facundo Cuellar MD sent at 11/17/2020  6:20 PM CST -----  Inform him that his COVID-19 swab is negative

## 2020-11-30 ENCOUNTER — TELEPHONE (OUTPATIENT)
Dept: UROLOGY | Facility: CLINIC | Age: 60
End: 2020-11-30

## 2020-11-30 NOTE — TELEPHONE ENCOUNTER
Spoke with pt and he was inquiring about the procedure with the prep instructions. Nurse re-educated him about magnesium citrate to be completed by 2 pm, 2 fleets enema, one to be done by 9 pm and the other in the morning, pt states that he was told to be to [facility] at 6 am but will be leaving the house at 4am, nurse advise pt that the enemas will not cause pt to have diarrhea it is to clean the rectum before the procedure. Pt verbalized understanding. NB

## 2020-12-01 ENCOUNTER — OUTSIDE PLACE OF SERVICE (OUTPATIENT)
Dept: UROLOGY | Facility: CLINIC | Age: 60
End: 2020-12-01

## 2020-12-01 PROCEDURE — 52630 REMOVE PROSTATE REGROWTH: CPT | Mod: ,,, | Performed by: SPECIALIST

## 2020-12-01 PROCEDURE — 52630 PR REMV RESID OBSTRUC PROSTATE,>1 YR: ICD-10-PCS | Mod: ,,, | Performed by: SPECIALIST

## 2020-12-02 ENCOUNTER — TELEPHONE (OUTPATIENT)
Dept: UROLOGY | Facility: CLINIC | Age: 60
End: 2020-12-02

## 2020-12-02 LAB
ANION GAP SERPL CALC-SCNC: 6 MMOL/L (ref 3–11)
BUN SERPL-MCNC: 6 MG/DL (ref 7–18)
BUN/CREAT SERPL: 6.81 RATIO (ref 7–18)
CALCIUM SERPL-MCNC: 8.6 MG/DL (ref 8.8–10.5)
CHLORIDE SERPL-SCNC: 104 MMOL/L (ref 100–108)
CO2 SERPL-SCNC: 30 MMOL/L (ref 21–32)
CREAT SERPL-MCNC: 0.88 MG/DL (ref 0.7–1.3)
ERYTHROCYTE [DISTWIDTH] IN BLOOD BY AUTOMATED COUNT: 13.3 % (ref 12.5–18)
GFR ESTIMATION: > 60
GLUCOSE SERPL-MCNC: 117 MG/DL (ref 70–110)
HCT VFR BLD AUTO: 38.3 % (ref 42–52)
HGB BLD-MCNC: 12.3 G/DL (ref 14–18)
MCH RBC QN AUTO: 30 PG (ref 27–31.2)
MCHC RBC AUTO-ENTMCNC: 32.1 G/DL (ref 31.8–35.4)
MCV RBC AUTO: 93.4 FL (ref 80–97)
NUCLEATED RED BLOOD CELLS: 0 %
PLATELETS: 205 10*3/UL (ref 142–424)
POTASSIUM SERPL-SCNC: 3.7 MMOL/L (ref 3.6–5.2)
RBC # BLD AUTO: 4.1 10*6/UL (ref 4.7–6.1)
SODIUM BLD-SCNC: 140 MMOL/L (ref 135–145)
SPECIMEN TO PATHOLOGY: NORMAL
WBC # BLD: 8.3 10*3/UL (ref 4.6–10.2)

## 2020-12-02 NOTE — TELEPHONE ENCOUNTER
Contacted pt and he informed nurse that catheter was nicked and is leaking from the tubing, nurse advise pt to go to  to have catheter changed, pt states that he would rather come to the clinic and states that he will come to clinic in the morning for staff to change catheter, nurse verbalized understanding. NB      ----- Message from Brian Conner sent at 12/2/2020  3:41 PM CST -----  Contact: self  Type:  Patient Returning Call    Who Called:pt  Who Left Message for Patient:eliezer  Does the patient know what this is regarding?:no  Would the patient rather a call back or a response via [facility]? Call back  -Best Call Back Number:[phone]  Additional Information: none

## 2020-12-03 ENCOUNTER — CLINICAL SUPPORT (OUTPATIENT)
Dept: UROLOGY | Facility: CLINIC | Age: 60
End: 2020-12-03

## 2020-12-03 DIAGNOSIS — Z90.79 S/P TURP: Primary | ICD-10-CM

## 2020-12-03 NOTE — PROGRESS NOTES
Pt in clinic today for catheter issues, nurse assisted TIGIST with cath bag change and securing catheter properly, TIGIST educated pt on proper care and placement of catheter tubing, pt verbalized understanding. NB

## 2020-12-08 ENCOUNTER — CLINICAL SUPPORT (OUTPATIENT)
Dept: UROLOGY | Facility: CLINIC | Age: 60
End: 2020-12-08

## 2020-12-08 ENCOUNTER — OFFICE VISIT (OUTPATIENT)
Dept: UROLOGY | Facility: CLINIC | Age: 60
End: 2020-12-08

## 2020-12-08 ENCOUNTER — TELEPHONE (OUTPATIENT)
Dept: UROLOGY | Facility: CLINIC | Age: 60
End: 2020-12-08

## 2020-12-08 DIAGNOSIS — Z90.79 S/P TURP: ICD-10-CM

## 2020-12-08 DIAGNOSIS — Z90.79 S/P TURP: Primary | ICD-10-CM

## 2020-12-08 DIAGNOSIS — Z97.8 FOLEY CATHETER IN PLACE: Primary | ICD-10-CM

## 2020-12-08 PROCEDURE — 99024 PR POST-OP FOLLOW-UP VISIT: ICD-10-PCS | Mod: S$GLB,,, | Performed by: NURSE PRACTITIONER

## 2020-12-08 PROCEDURE — 99024 POSTOP FOLLOW-UP VISIT: CPT | Mod: S$GLB,,, | Performed by: NURSE PRACTITIONER

## 2020-12-08 NOTE — TELEPHONE ENCOUNTER
Call from pt stating that he's having catheter trouble, he says it feels as if he's having blockage and a burning sensation everytime he goes and wants us to take a look. Advised pt to come in. IVNITA

## 2020-12-08 NOTE — PROGRESS NOTES
Subjective:       Patient ID: Piter Robert is a 59 y.o. male.    Chief Complaint: No chief complaint on file.      HPI: 59-year-old male known service Dr. Cuellar who presented the office today with a leaking Daniel catheter.  I was asked to see the patient as Dr. Cuellar is in surgery.  Patient states he underwent TURP December 1st.  He has been having leakage with his 3 way catheter attached to a leg bag for couple days now.  He has been afebrile.  Denies any blood in the urine.  Denies any cloudiness to the urine.  He further reports discomfort with bladder spasms.  No constipation.  He has completed antibiotics as prescribed.       Past Medical History:   Past Medical History:   Diagnosis Date    BPH with obstruction/lower urinary tract symptoms        Past Surgical Historical:   Past Surgical History:   Procedure Laterality Date    TRANSURETHRAL RESECTION OF PROSTATE          Medications:   Medication List with Changes/Refills   Current Medications    LACTULOSE (CHRONULAC) 10 GRAM/15 ML SOLUTION    Take by mouth 3 (three) times daily.        Past Social History:   Social History     Socioeconomic History    Marital status: Single     Spouse name: Not on file    Number of children: Not on file    Years of education: Not on file    Highest education level: Not on file   Occupational History    Not on file   Social Needs    Financial resource strain: Not on file    Food insecurity     Worry: Not on file     Inability: Not on file    Transportation needs     Medical: Not on file     Non-medical: Not on file   Tobacco Use    Smoking status: Former Smoker     Types: Cigarettes    Smokeless tobacco: Never Used   Substance and Sexual Activity    Alcohol use: Never     Frequency: Never    Drug use: Never    Sexual activity: Not on file   Lifestyle    Physical activity     Days per week: Not on file     Minutes per session: Not on file    Stress: Not on file   Relationships    Social connections     Talks on  phone: Not on file     Gets together: Not on file     Attends [employer] service: Not on file     Active member of club or organization: Not on file     Attends meetings of clubs or organizations: Not on file     Relationship status: Not on file   Other Topics Concern    Not on file   Social History Narrative    Not on file       Allergies: Review of patient's allergies indicates:  No Known Allergies     Family History:   Family History   Problem Relation Age of Onset    No Known Problems Father     Cancer Mother         Review of Systems:  Review of Systems   Constitutional: Negative for fever and unexpected weight change.   HENT: Negative for facial swelling and trouble swallowing.    Eyes: Negative for pain and visual disturbance.   Respiratory: Negative for chest tightness, shortness of breath and wheezing.    Cardiovascular: Negative for leg swelling.   Gastrointestinal: Negative for abdominal distention, abdominal pain, anal bleeding, blood in stool and rectal pain.   Genitourinary: Negative for decreased urine volume, difficulty urinating, dysuria, enuresis, flank pain, frequency, hematuria and urgency.   Musculoskeletal: Negative for back pain.   Skin: Negative for color change.   Neurological: Negative for dizziness, seizures, syncope and weakness.   Psychiatric/Behavioral: Negative for suicidal ideas.       Physical Exam:  Physical Exam  Constitutional:       Appearance: He is well-developed.   HENT:      Head: Normocephalic.   Eyes:      General: No scleral icterus.  Neck:      Musculoskeletal: Normal range of motion.   Pulmonary:      Effort: Pulmonary effort is normal.      Breath sounds: Normal breath sounds.   Abdominal:      General: There is no distension.      Palpations: Abdomen is soft.      Tenderness: There is no abdominal tenderness.      Hernia: No hernia is present. There is no hernia in the right inguinal area or left inguinal area.   Genitourinary:     Penis: Normal.        Scrotum/Testes: Normal. Cremasteric reflex is present.      Comments: Three way Daniel catheter attached to leg bag.  Urine in the tubing is clear.  The tubing is pinched secondary to excessive length of the tubing.  A new proper length tubing was attached along with a new leg bag.  Skin:     General: Skin is warm and dry.   Neurological:      Mental Status: He is alert and oriented to person, place, and time.     .    Assessment/Plan:     Status post TURP--leaking Daniel attachment.  Corrected with shortening of the tubing.    Bladder spasms--sample Myrbetriq 25 mg 1 daily x2 weeks.  Ensured patient had return postop appointment schedule with Dr. Cuellar; will be seen on as-needed basis prior to that  Problem List Items Addressed This Visit     None

## 2020-12-08 NOTE — PROGRESS NOTES
Patient presented to clinic with a 22fr cath attached to left leg bag filled with clear yellow urine.  Patient c/o leakage around cath site.  Per CRISTO Barragna orders: cath was flushed with 60cc of sterile water, small clot passed with blood-tinged urine return.  Patient requested to stop flushing because he felt the urge to void, flushing stopped and leakage was noted around cath site.  Informed NP and she will follow-up with patient.

## 2020-12-08 NOTE — PROGRESS NOTES
Catheter assessed. Pt was very protective of insertion site, noted a small amount of mucus discharge at insertion site as well as penile swelling. Catheter flushed with around 20cc by LPN and clear yellow urine flowed into the bag. LPN denies resistance with flushing. Pt was insistent that he needed to see Dr. Cuellar, we explained that he was in surgery today but I would notify him via telephone. Patient left the clinic in stable condition, ambulating independently. I spoke with Dr. Cuellar via phone and updated on patient condition.

## 2020-12-09 ENCOUNTER — TELEPHONE (OUTPATIENT)
Dept: UROLOGY | Facility: CLINIC | Age: 60
End: 2020-12-09

## 2020-12-09 NOTE — TELEPHONE ENCOUNTER
----- Message from Chriss Oliveros sent at 12/9/2020  8:07 AM CST -----  Regarding: Catheter/appt question  Please call Piter to discuss a catheter question he has and a question about an appointment [phone] ([place]).

## 2020-12-09 NOTE — TELEPHONE ENCOUNTER
Spoke with patient and explained that Dr. Cuellar wants to see patient in clinic on Thursday 12/10/2020, appt made, patient verbalized understanding.

## 2020-12-10 ENCOUNTER — OFFICE VISIT (OUTPATIENT)
Dept: UROLOGY | Facility: CLINIC | Age: 60
End: 2020-12-10

## 2020-12-10 VITALS
RESPIRATION RATE: 18 BRPM | WEIGHT: 219 LBS | HEIGHT: 68 IN | BODY MASS INDEX: 33.19 KG/M2 | SYSTOLIC BLOOD PRESSURE: 135 MMHG | HEART RATE: 95 BPM | DIASTOLIC BLOOD PRESSURE: 79 MMHG

## 2020-12-10 DIAGNOSIS — Z90.79 S/P TURP: Primary | ICD-10-CM

## 2020-12-10 DIAGNOSIS — R31.0 HEMATURIA, GROSS: ICD-10-CM

## 2020-12-10 DIAGNOSIS — N40.1 BPH WITH OBSTRUCTION/LOWER URINARY TRACT SYMPTOMS: ICD-10-CM

## 2020-12-10 DIAGNOSIS — N13.8 BPH WITH OBSTRUCTION/LOWER URINARY TRACT SYMPTOMS: ICD-10-CM

## 2020-12-10 PROCEDURE — 99499 NO LOS: ICD-10-PCS | Mod: S$GLB,,, | Performed by: SPECIALIST

## 2020-12-10 PROCEDURE — 99499 UNLISTED E&M SERVICE: CPT | Mod: S$GLB,,, | Performed by: SPECIALIST

## 2020-12-10 NOTE — PROGRESS NOTES
Subjective:       Patient ID: Piter Robert is a 59 y.o. male.    Chief Complaint: Follow-up (Post op TURP)      HPI:  59-year-old man who is presenting following a re-resection of prostatic tissue that was performed on 12/01/2020.    Patient presented with a chief complaint of gross hematuria.  CT urogram was negative.  Cystoscopy showed a regrowth of a median lobe as a source of the bleeding.  He has also was experiencing some urinary symptoms.  We recommended a re-resection of the tissue.    Pathology was reviewed today we had resected 14 g of benign prostatic tissue with glandular and stromal hypertrophy and patchy chronic inflammation.    Patient is here today for Daniel catheter removal.    Past Medical History:   Past Medical History:   Diagnosis Date    BPH with obstruction/lower urinary tract symptoms        Past Surgical Historical:   Past Surgical History:   Procedure Laterality Date    TRANSURETHRAL RESECTION OF PROSTATE          Medications:   Medication List with Changes/Refills   Current Medications    LACTULOSE (CHRONULAC) 10 GRAM/15 ML SOLUTION    Take by mouth 3 (three) times daily.        Past Social History:   Social History     Socioeconomic History    Marital status: Single     Spouse name: Not on file    Number of children: Not on file    Years of education: Not on file    Highest education level: Not on file   Occupational History    Not on file   Social Needs    Financial resource strain: Not on file    Food insecurity     Worry: Not on file     Inability: Not on file    Transportation needs     Medical: Not on file     Non-medical: Not on file   Tobacco Use    Smoking status: Former Smoker     Types: Cigarettes    Smokeless tobacco: Never Used   Substance and Sexual Activity    Alcohol use: Never     Frequency: Never    Drug use: Never    Sexual activity: Not on file   Lifestyle    Physical activity     Days per week: Not on file     Minutes per session: Not on file     Stress: Not on file   Relationships    Social connections     Talks on phone: Not on file     Gets together: Not on file     Attends [employer] service: Not on file     Active member of club or organization: Not on file     Attends meetings of clubs or organizations: Not on file     Relationship status: Not on file   Other Topics Concern    Not on file   Social History Narrative    Not on file       Allergies: Review of patient's allergies indicates:  No Known Allergies     Family History:   Family History   Problem Relation Age of Onset    No Known Problems Father     Cancer Mother           Physical Exam:  Gen:  Alert and oriented x3; no acute distress  HEENT: NC/AT; PERRLA, Moist mucous membranes  Chest: CTAB  CVS: RR, Normal Rate, Normal cap refills  Abd: S/NT/ND  :  Daniel catheter in place draining clear urine      Assessment/Plan:       59-year-old man status post TURP who is here for catheter removal.    1.  We discussed the pathology on his resected prostatic tissue  2.  Patient return to clinic in 6 weeks for interim followup.  3.  Daniel catheter was removed today.    Problem List Items Addressed This Visit     None      Visit Diagnoses     S/P TURP    -  Primary    BPH with obstruction/lower urinary tract symptoms        Hematuria, gross

## 2020-12-10 NOTE — PROGRESS NOTES
Pt in clinic today for catheter removal, 22 fr with 30 mL fluid removed, tolerated well, educated on expected s/s of post catheter removal, pt verbalized understanding. NB

## 2020-12-11 ENCOUNTER — TELEPHONE (OUTPATIENT)
Dept: UROLOGY | Facility: CLINIC | Age: 60
End: 2020-12-11

## 2020-12-11 NOTE — TELEPHONE ENCOUNTER
----- Message from Radha Sharma sent at 12/9/2020  4:03 PM CST -----  Regarding: COVID FU  SURGERY-12/1/2020

## 2021-01-21 ENCOUNTER — TELEPHONE (OUTPATIENT)
Dept: UROLOGY | Facility: CLINIC | Age: 61
End: 2021-01-21

## 2021-01-21 ENCOUNTER — OFFICE VISIT (OUTPATIENT)
Dept: UROLOGY | Facility: CLINIC | Age: 61
End: 2021-01-21

## 2021-01-21 VITALS
DIASTOLIC BLOOD PRESSURE: 90 MMHG | WEIGHT: 219 LBS | HEART RATE: 80 BPM | BODY MASS INDEX: 33.19 KG/M2 | HEIGHT: 68 IN | SYSTOLIC BLOOD PRESSURE: 146 MMHG | RESPIRATION RATE: 18 BRPM

## 2021-01-21 DIAGNOSIS — N40.1 BPH WITH OBSTRUCTION/LOWER URINARY TRACT SYMPTOMS: Primary | ICD-10-CM

## 2021-01-21 DIAGNOSIS — Z90.79 S/P TURP: ICD-10-CM

## 2021-01-21 DIAGNOSIS — N13.8 BPH WITH OBSTRUCTION/LOWER URINARY TRACT SYMPTOMS: Primary | ICD-10-CM

## 2021-01-21 LAB — PSA, DIAGNOSTIC: 1.8 NG/ML (ref 0–4)

## 2021-01-21 PROCEDURE — 36415 COLL VENOUS BLD VENIPUNCTURE: CPT | Mod: S$GLB,,, | Performed by: NURSE PRACTITIONER

## 2021-01-21 PROCEDURE — 99024 PR POST-OP FOLLOW-UP VISIT: ICD-10-PCS | Mod: S$GLB,,, | Performed by: NURSE PRACTITIONER

## 2021-01-21 PROCEDURE — 99024 POSTOP FOLLOW-UP VISIT: CPT | Mod: S$GLB,,, | Performed by: NURSE PRACTITIONER

## 2021-01-21 PROCEDURE — 36415 PR COLLECTION VENOUS BLOOD,VENIPUNCTURE: ICD-10-PCS | Mod: S$GLB,,, | Performed by: NURSE PRACTITIONER

## 2021-01-21 RX ORDER — HYDROCODONE BITARTRATE AND ACETAMINOPHEN 5; 325 MG/1; MG/1
TABLET ORAL
COMMUNITY
Start: 2020-12-02

## 2021-01-21 RX ORDER — HYOSCYAMINE SULFATE 0.12 MG/1
TABLET SUBLINGUAL
COMMUNITY
Start: 2020-12-02